# Patient Record
Sex: FEMALE | Race: WHITE | NOT HISPANIC OR LATINO | URBAN - METROPOLITAN AREA
[De-identification: names, ages, dates, MRNs, and addresses within clinical notes are randomized per-mention and may not be internally consistent; named-entity substitution may affect disease eponyms.]

---

## 2023-09-22 ENCOUNTER — HOSPITAL ENCOUNTER (OUTPATIENT)
Dept: DATA CONVERSION | Facility: HOSPITAL | Age: 83
End: 2023-09-22
Attending: INTERNAL MEDICINE | Admitting: INTERNAL MEDICINE

## 2023-09-22 DIAGNOSIS — R91.1 SOLITARY PULMONARY NODULE: ICD-10-CM

## 2023-09-22 DIAGNOSIS — R91.8 OTHER NONSPECIFIC ABNORMAL FINDING OF LUNG FIELD: ICD-10-CM

## 2023-09-22 DIAGNOSIS — R59.9 ENLARGED LYMPH NODES, UNSPECIFIED: ICD-10-CM

## 2023-09-22 DIAGNOSIS — C96.9 MALIGNANT NEOPLASM OF LYMPHOID, HEMATOPOIETIC AND RELATED TISSUE, UNSPECIFIED (MULTI): ICD-10-CM

## 2023-09-28 LAB
COMPLETE PATHOLOGY REPORT: NORMAL
CONVERTED CLINICAL DIAGNOSIS-HISTORY: NORMAL
CONVERTED DIAGNOSIS COMMENT: NORMAL
CONVERTED FINAL DIAGNOSIS: NORMAL
CONVERTED FINAL REPORT PDF LINK TO COPY AND PASTE: NORMAL
CONVERTED RAPID EVALUATION: NORMAL
CONVERTED SPECIMEN DESCRIPTION: NORMAL

## 2023-09-29 VITALS — HEIGHT: 60 IN | BODY MASS INDEX: 22.59 KG/M2 | WEIGHT: 115.08 LBS

## 2023-10-02 LAB
COMPLETE PATHOLOGY REPORT: NORMAL
CONVERTED CLINICAL DIAGNOSIS-HISTORY: NORMAL
CONVERTED FINAL DIAGNOSIS: NORMAL
CONVERTED FINAL REPORT PDF LINK TO COPY AND PASTE: NORMAL
CONVERTED GROSS DESCRIPTION: NORMAL

## 2023-11-13 PROCEDURE — 77290 THER RAD SIMULAJ FIELD CPLX: CPT | Performed by: RADIOLOGY

## 2023-11-13 PROCEDURE — 77263 THER RADIOLOGY TX PLNG CPLX: CPT | Performed by: RADIOLOGY

## 2023-11-14 PROCEDURE — 77300 RADIATION THERAPY DOSE PLAN: CPT | Performed by: RADIOLOGY

## 2023-11-14 PROCEDURE — 77295 3-D RADIOTHERAPY PLAN: CPT | Performed by: RADIOLOGY

## 2023-11-14 PROCEDURE — 77334 RADIATION TREATMENT AID(S): CPT | Performed by: RADIOLOGY

## 2023-11-22 PROCEDURE — 77427 RADIATION TX MANAGEMENT X5: CPT | Performed by: RADIOLOGY

## 2024-08-27 ENCOUNTER — NURSING HOME VISIT (OUTPATIENT)
Dept: POST ACUTE CARE | Facility: EXTERNAL LOCATION | Age: 84
End: 2024-08-27

## 2024-08-27 DIAGNOSIS — C34.90 MALIGNANT NEOPLASM OF LUNG, UNSPECIFIED LATERALITY, UNSPECIFIED PART OF LUNG (MULTI): ICD-10-CM

## 2024-08-27 DIAGNOSIS — G93.41 SEPSIS WITH METABOLIC ENCEPHALOPATHY (MULTI): ICD-10-CM

## 2024-08-27 DIAGNOSIS — I25.118 CORONARY ARTERY DISEASE INVOLVING NATIVE HEART WITH OTHER FORM OF ANGINA PECTORIS, UNSPECIFIED VESSEL OR LESION TYPE (CMS-HCC): ICD-10-CM

## 2024-08-27 DIAGNOSIS — R65.20 SEPSIS WITH METABOLIC ENCEPHALOPATHY (MULTI): ICD-10-CM

## 2024-08-27 DIAGNOSIS — J44.9 CHRONIC OBSTRUCTIVE PULMONARY DISEASE, UNSPECIFIED COPD TYPE (MULTI): Primary | ICD-10-CM

## 2024-08-27 DIAGNOSIS — A41.9 SEPSIS WITH METABOLIC ENCEPHALOPATHY (MULTI): ICD-10-CM

## 2024-08-27 DIAGNOSIS — I48.91 ATRIAL FIBRILLATION, UNSPECIFIED TYPE (MULTI): ICD-10-CM

## 2024-08-27 DIAGNOSIS — J96.91 RESPIRATORY FAILURE WITH HYPOXIA, UNSPECIFIED CHRONICITY (MULTI): ICD-10-CM

## 2024-08-27 PROBLEM — I25.10 CORONARY ARTERY DISEASE INVOLVING NATIVE HEART: Status: ACTIVE | Noted: 2024-08-27

## 2024-08-27 PROCEDURE — 99497 ADVNCD CARE PLAN 30 MIN: CPT | Performed by: EMERGENCY MEDICINE

## 2024-08-27 PROCEDURE — 99306 1ST NF CARE HIGH MDM 50: CPT | Performed by: EMERGENCY MEDICINE

## 2024-08-27 NOTE — PROGRESS NOTES
Tracy Cleveland   84 y.o.  female  @location@            Assessment and Plan:  History and physical    Diagnoses This Visit COPD with acute exacerbation Acute onset sepsis Acute UTI Lung cancer   Discharge Medications   New albuterol = Proventil, Ventolin (Proventil HFA 90 mcg/inh inhalation aerosol)1 Puffs Inhalation EVERY FOUR HOURS as needed for wheezing. Refills: 0. ALPRAZolam (Xanax 0.25 mg oral tablet)1 Tabs Oral THREE TIMES A DAY as needed for anxiety for 7 Days. Refills: 0. guaiFENesin (Mucinex 600 mg oral tablet, extended release)1 Tabs Oral EVERY TWELVE HOURS as needed Cough. losartan (losartan 50 mg oral tablet)1 Tabs Oral DAILY. methylPREDNISolone (Medrol 4 mg oral tablet)1 Packets Oral DAILY for 6 Days. as directed on package labeling. Refills: 0. metoprolol (Lopressor 50 mg oral tablet)0.5 Tabs Oral TWICE A DAY. Refills: 0.   Unchanged acetaminophen-oxycodone (Percocet 10 mg-325 mg oral tablet)1 Tabs Oral EVERY SIX HOURS as needed Severe Pain. baclofen = Lioresal (baclofen 5 mg oral tablet)1 Tabs Oral DAILY. Refills: 1.   Discontinued azithromycin (Zithromax TRI-MAYCO 500 mg oral tablet)1 Tabs Oral DAILY. stop date 8/16/24. Refills: 0. Hospital Course   This is the addendum to previously dictated discharge summary. Initially patient is planned to be released to home. But she is very weak. Could not take care of herself. Evaluated by physical therapist who recommended nursing home placement. Patient will be released to nursing home for further care.      I discussed advanced care planning for more than 16 minutes including the explanation and discussion of advanced directives. Information and advise was also provided on DO NOT RESUSCITATE and patient encouraged to consider this  Patient is not sure about DNR at this time.      Source of history: Nurse, Medical personnel, Medical record, Patient.  History limitation: None.    HPI:  History and physical    Patient is unable to give any detailed history and  therefore history is obtained from the chart  No acute complaints voiced by the patient or acute concerns raised by nursing    History of hospitalization-    Chief Complaint Shortness of breath History of Present Illness Patient is an 84-year-old female with history of chronic respiratory failure on home O2, COPD, lung cancer. She developed sudden onset of progressive shortness of breath since early in the morning. No fever or chills, no cough, no chest pain. No abdominal pain nausea or vomiting. Presented to the ED for evaluation. Initially she was seen respiratory acute distress. She received a bronchodilator aerosol treatment back-to-back. Chest x-ray did not show any new infiltrate. ABG was unremarkable. At the time of my evaluation she is feeling much better. Patient will be admitted for COPD exacerbation    Problem List/Past Medical History     Ongoing   Acute epigastric pain    Acute hypoxemic respiratory failure due to COVID-19    Acute respiratory failure, unspecified whether with hypoxia or hypercapnia    Anemia    Anxiety state    Atrial fibrillation    Cardiomyopathy    Cataract    Cellulitis of left lower limb    Cellulitis of lower limb    Cervical spondylosis    Chronic obstructive lung disease    Chronic obstructive pulmonary disease with acute lower respiratory infection    Chronic post-traumatic stress disorder    Chronic respiratory failure with hypoxia    Closed wedge compression fracture of seventh thoracic vertebra with routine healing    Compression fracture of vertebral column    COPD (chronic obstructive pulmonary disease)    COPD exacerbation    Coronary arteriosclerosis    COVID-19    Cystitis    Delirium    Depression    Depressive disorder    Diabetes mellitus    Diabetes mellitus    Difficulty in walking, not elsewhere classified    Disorder of lumbar spine    Dyspnea    Enlarged lymph node in neck    ESBL Escherichia coli    Essential  hypertension    Fibromyalgia    Fibromyalgia    GERD (gastroesophageal reflux disease)    High Blood Pressure    History of peripheral neuropathy    History of urinary tract infection    Hospitalization within last 30 days    Hypoxia    Incontinence of urine    Injury of kidney    Insomnia    Insomnia disorder    Intestinal obstruction    Long term (current) use of aspirin    Long term prescription opiate use    Lumbar arthropathy    Lung mass    Mass of right lower lobe of lung    Mediastinal lymphadenopathy    Metabolic encephalopathy    Mitral valve disorder    Mixed hyperlipidemia    Mixed hyperlipidemia    Muscle weakness    Myocardial infarction type 2    Need for vaccination    Neuropathy    Obesity due to excess calories    Osteoarthritis    Osteoporosis    Osteoporosis    Paraesophageal hernia    Paraesophageal hernia    Paresthesia    Patella fracture    Peripheral edema    Personal history of (healed) traumatic fracture    Primary gonarthrosis, bilateral    Unspecified fracture of left patella, subsequent encounter for closed fracture with routine healing    Unspecified hearing loss, unspecified ear    Vertebral compression fracture    Vertebral compression fracture    Viral pneumonia    Wellness examination    Historical   Solitary nodule of lung      Procedure/Surgical History   Colonoscopy.: 05/01/23   esophagogastroduodenoscopy(EGD).: 04/28/23   esophagogastroduodenoscopy(EGD).: 02/01/23   esophagogastroduodenoscopy(EGD).: 09/18/22   CAPSULE ENDOSCOPY: 06/23/22   COLONOSCOPY: 06/22/22   esophagogastroduodenoscopy(EGD).: 06/18/22   esophagogastroduodenoscopy(EGD).: 04/26/22   esophagogastroduodenoscopy(EGD).: 07/13/21   EGD: 03/11/21   Colonoscopy: 02/16/21   esophagogastroduodenoscopy(EGD).: 01/16/21   CATARACT SURGERY - RIGHT: 08/2017   CATARACT SURGERY - LEFT: 08/2017   Total Knee Replacement.: 12/15/16   ECHOCARDIOGRAM: 11/29/16   colonscopy: 07/21/16   esophagogastroduodenoscopy(EGD).: 07/05/16    nuclear stress normal: 10/20/14   EGD: 2012   incisional hernia: 02/19/08   twisted bowels: 03/20/06   bowel surgery   nuclear stress 5/2015   normal   1/2 partial SBO   Colonoscopy.      Medications     Inpatient   acetaminophen, 650 mg= 2 tabs, ORAL, [id number], PRN    acetaminophen, 650 mg= 2 tabs, ORAL, [id number], PRN    acetaminophen, 650 mg= 2 tabs, ORAL, [id number], PRN    acetaminophen-oxycodone(acetaminophen-oxycodone 325 mg-5 mg oral tablet = Percocet), 2 tabs, ORAL, [id number], PRN    albuterol-ipratropium(albuterol-ipratropium 2.5 mg-0.5 mg/3 mL inhalation solution = DuoNeb), 3 mL, Inhalation, [id number] RESPIRATORY    azithromycin, 500 mg= 255 mL, IV Piggyback, ONCE    azithromycin(Zithromax IV), 500 mg= 255 mL, IV Piggyback, [id number]    baclofen = Lioresal, 5 mg= 0.5 tabs, ORAL, DAILY    ceftriaxone, 1 g= 50 mL, IV Piggyback, ONCE    enoxaparin(Lovenox), 40 mg= 0.4 mL, Subcutaneous, QHS    melatonin, 5 mg= 1 tabs, ORAL, [id number], PRN    methylPREDNISolone(SOLU-Medrol), 40 mg= 1 mL, IV Push, [id number]    naloxone = Narcan, 0.4 mg= 1 mL, IV Push, PRN, PRN    sodium chloride(Saline Flush), 3 mL, IV Push, [id number]    sodium chloride(Saline Flush), 3 mL, IV Push, PRN, PRN    Home   acetaminophen-oxycodone(Percocet 10 mg-325 mg oral tablet), 1 tabs, ORAL, [id number], PRN    baclofen = Lioresal(baclofen 5 mg oral tablet), 5 mg= 1 tabs, ORAL, DAILY, 1 refills      Allergies     Acetaminophen-Propoxyphene Napsylate Unknown    Demerol HCl vomit/ headache    morphine Unknown    shellfish Headache      Social History       Alcohol - Denies Alcohol Use      Domestic Concerns    *Financial Concerns Regarding Hospitalization/DischargeNo    Financial ConcernsNo      Employment/School    Status:Retired    Highest education:High school      Exercise      Home/Environment    Lives with:Daughter    *Living Situation Prior to AdmissionHome/Independent    Home equipment:Respiratory treatments    Monitoring Equipment in homeApnea  monitor, Glucose monitoring, Blood pressure    *Special Services and Community Resources Prior to AdmissionNone    *Mobility Assistance Prior to AdmissionIndependent    Home BarriersInternal Stairs    *Will patient require additional/new services upon discharge?No      Nutrition/Health    Type of diet:Regular    AppetiteGood    Feeding AssistanceIndependent      Other    Name:Daily Caffeine    Details:Consumes on average 5-10 cups of regular coffee per day Consumes on average 12oz of soda per day - diet pepsi Consumes chocolate occasionally      Substance Abuse - Denies Substance Abuse      Tobacco - Denies Tobacco Use    Use:Former smoker, quit more than 30 days ago      Family History     Arthritis: Mother.    Cancer: Negative: Mother, Father, Daughter and Son.    Colonic polyp: Negative: Daughter and Son.    Dementia: Mother.    Diabetes..: Father.    Heart disease: Father.    High blood pressure..: Mother and Father.    Health Status Family Member(s)     Family Member(s)   Relationship: Father, Age: Unknown    Relationship: Mother, Age: Unknown      Physical Exam:  Vital signs as per nursing/MA documentation were reviewed  General appearance: Alert and in no acute distress  HEENT: Normal Inspection  Neck - Normal Inspection  Respiratory : No respiratory distress. Lungs are clear   Cardiovascular: heart rate normal. No gallop  Back - normal inspection  Skin inspection:Warm  Musculoskeletal : No deformities  Neuro : Limited exam. Baseline    ROS: Review of symptoms is negative except for what is mentioned in HPI    Results/Data  Records including but not limited to electronic medical records, relevant lab work and imaging from patient's health care facility encounter were reviewed and independently verified      Charting was completed using voice recognition technology and may include unintended errors.    Discussed with patient/family, RN, and NP.

## 2024-08-27 NOTE — LETTER
Patient: Tracy Cleveland  : 1940    Encounter Date: 2024    Tracy Cleveland   84 y.o.  female  @location@            Assessment and Plan:  History and physical    Diagnoses This Visit COPD with acute exacerbation Acute onset sepsis Acute UTI Lung cancer   Discharge Medications   New albuterol = Proventil, Ventolin (Proventil HFA 90 mcg/inh inhalation aerosol)1 Puffs Inhalation EVERY FOUR HOURS as needed for wheezing. Refills: 0. ALPRAZolam (Xanax 0.25 mg oral tablet)1 Tabs Oral THREE TIMES A DAY as needed for anxiety for 7 Days. Refills: 0. guaiFENesin (Mucinex 600 mg oral tablet, extended release)1 Tabs Oral EVERY TWELVE HOURS as needed Cough. losartan (losartan 50 mg oral tablet)1 Tabs Oral DAILY. methylPREDNISolone (Medrol 4 mg oral tablet)1 Packets Oral DAILY for 6 Days. as directed on package labeling. Refills: 0. metoprolol (Lopressor 50 mg oral tablet)0.5 Tabs Oral TWICE A DAY. Refills: 0.   Unchanged acetaminophen-oxycodone (Percocet 10 mg-325 mg oral tablet)1 Tabs Oral EVERY SIX HOURS as needed Severe Pain. baclofen = Lioresal (baclofen 5 mg oral tablet)1 Tabs Oral DAILY. Refills: 1.   Discontinued azithromycin (Zithromax TRI-MAYCO 500 mg oral tablet)1 Tabs Oral DAILY. stop date 24. Refills: 0. Hospital Course   This is the addendum to previously dictated discharge summary. Initially patient is planned to be released to home. But she is very weak. Could not take care of herself. Evaluated by physical therapist who recommended nursing home placement. Patient will be released to nursing home for further care.      I discussed advanced care planning for more than 16 minutes including the explanation and discussion of advanced directives. Information and advise was also provided on DO NOT RESUSCITATE and patient encouraged to consider this  Patient is not sure about DNR at this time.      Source of history: Nurse, Medical personnel, Medical record, Patient.  History limitation: None.    HPI:  History  and physical    Patient is unable to give any detailed history and therefore history is obtained from the chart  No acute complaints voiced by the patient or acute concerns raised by nursing    History of hospitalization-    Chief Complaint Shortness of breath History of Present Illness Patient is an 84-year-old female with history of chronic respiratory failure on home O2, COPD, lung cancer. She developed sudden onset of progressive shortness of breath since early in the morning. No fever or chills, no cough, no chest pain. No abdominal pain nausea or vomiting. Presented to the ED for evaluation. Initially she was seen respiratory acute distress. She received a bronchodilator aerosol treatment back-to-back. Chest x-ray did not show any new infiltrate. ABG was unremarkable. At the time of my evaluation she is feeling much better. Patient will be admitted for COPD exacerbation    Problem List/Past Medical History     Ongoing   Acute epigastric pain    Acute hypoxemic respiratory failure due to COVID-19    Acute respiratory failure, unspecified whether with hypoxia or hypercapnia    Anemia    Anxiety state    Atrial fibrillation    Cardiomyopathy    Cataract    Cellulitis of left lower limb    Cellulitis of lower limb    Cervical spondylosis    Chronic obstructive lung disease    Chronic obstructive pulmonary disease with acute lower respiratory infection    Chronic post-traumatic stress disorder    Chronic respiratory failure with hypoxia    Closed wedge compression fracture of seventh thoracic vertebra with routine healing    Compression fracture of vertebral column    COPD (chronic obstructive pulmonary disease)    COPD exacerbation    Coronary arteriosclerosis    COVID-19    Cystitis    Delirium    Depression    Depressive disorder    Diabetes mellitus    Diabetes mellitus    Difficulty in walking, not elsewhere classified    Disorder of lumbar spine    Dyspnea    Enlarged lymph node in neck    ESBL Escherichia  coli    Essential hypertension    Fibromyalgia    Fibromyalgia    GERD (gastroesophageal reflux disease)    High Blood Pressure    History of peripheral neuropathy    History of urinary tract infection    Hospitalization within last 30 days    Hypoxia    Incontinence of urine    Injury of kidney    Insomnia    Insomnia disorder    Intestinal obstruction    Long term (current) use of aspirin    Long term prescription opiate use    Lumbar arthropathy    Lung mass    Mass of right lower lobe of lung    Mediastinal lymphadenopathy    Metabolic encephalopathy    Mitral valve disorder    Mixed hyperlipidemia    Mixed hyperlipidemia    Muscle weakness    Myocardial infarction type 2    Need for vaccination    Neuropathy    Obesity due to excess calories    Osteoarthritis    Osteoporosis    Osteoporosis    Paraesophageal hernia    Paraesophageal hernia    Paresthesia    Patella fracture    Peripheral edema    Personal history of (healed) traumatic fracture    Primary gonarthrosis, bilateral    Unspecified fracture of left patella, subsequent encounter for closed fracture with routine healing    Unspecified hearing loss, unspecified ear    Vertebral compression fracture    Vertebral compression fracture    Viral pneumonia    Wellness examination    Historical   Solitary nodule of lung      Procedure/Surgical History   Colonoscopy.: 05/01/23   esophagogastroduodenoscopy(EGD).: 04/28/23   esophagogastroduodenoscopy(EGD).: 02/01/23   esophagogastroduodenoscopy(EGD).: 09/18/22   CAPSULE ENDOSCOPY: 06/23/22   COLONOSCOPY: 06/22/22   esophagogastroduodenoscopy(EGD).: 06/18/22   esophagogastroduodenoscopy(EGD).: 04/26/22   esophagogastroduodenoscopy(EGD).: 07/13/21   EGD: 03/11/21   Colonoscopy: 02/16/21   esophagogastroduodenoscopy(EGD).: 01/16/21   CATARACT SURGERY - RIGHT: 08/2017   CATARACT SURGERY - LEFT: 08/2017   Total Knee Replacement.: 12/15/16   ECHOCARDIOGRAM: 11/29/16   colonscopy: 07/21/16    esophagogastroduodenoscopy(EGD).: 07/05/16   nuclear stress normal: 10/20/14   EGD: 2012   incisional hernia: 02/19/08   twisted bowels: 03/20/06   bowel surgery   nuclear stress 5/2015   normal   1/2 partial SBO   Colonoscopy.      Medications     Inpatient   acetaminophen, 650 mg= 2 tabs, ORAL, [id number], PRN    acetaminophen, 650 mg= 2 tabs, ORAL, [id number], PRN    acetaminophen, 650 mg= 2 tabs, ORAL, [id number], PRN    acetaminophen-oxycodone(acetaminophen-oxycodone 325 mg-5 mg oral tablet = Percocet), 2 tabs, ORAL, [id number], PRN    albuterol-ipratropium(albuterol-ipratropium 2.5 mg-0.5 mg/3 mL inhalation solution = DuoNeb), 3 mL, Inhalation, [id number] RESPIRATORY    azithromycin, 500 mg= 255 mL, IV Piggyback, ONCE    azithromycin(Zithromax IV), 500 mg= 255 mL, IV Piggyback, [id number]    baclofen = Lioresal, 5 mg= 0.5 tabs, ORAL, DAILY    ceftriaxone, 1 g= 50 mL, IV Piggyback, ONCE    enoxaparin(Lovenox), 40 mg= 0.4 mL, Subcutaneous, QHS    melatonin, 5 mg= 1 tabs, ORAL, [id number], PRN    methylPREDNISolone(SOLU-Medrol), 40 mg= 1 mL, IV Push, [id number]    naloxone = Narcan, 0.4 mg= 1 mL, IV Push, PRN, PRN    sodium chloride(Saline Flush), 3 mL, IV Push, [id number]    sodium chloride(Saline Flush), 3 mL, IV Push, PRN, PRN    Home   acetaminophen-oxycodone(Percocet 10 mg-325 mg oral tablet), 1 tabs, ORAL, [id number], PRN    baclofen = Lioresal(baclofen 5 mg oral tablet), 5 mg= 1 tabs, ORAL, DAILY, 1 refills      Allergies     Acetaminophen-Propoxyphene Napsylate Unknown    Demerol HCl vomit/ headache    morphine Unknown    shellfish Headache      Social History       Alcohol - Denies Alcohol Use      Domestic Concerns    *Financial Concerns Regarding Hospitalization/DischargeNo    Financial ConcernsNo      Employment/School    Status:Retired    Highest education:High school      Exercise      Home/Environment    Lives with:Daughter    *Living Situation Prior to AdmissionHome/Independent    Home equipment:Respiratory  treatments    Monitoring Equipment in homeApnea monitor, Glucose monitoring, Blood pressure    *Special Services and Community Resources Prior to AdmissionNone    *Mobility Assistance Prior to AdmissionIndependent    Home BarriersInternal Stairs    *Will patient require additional/new services upon discharge?No      Nutrition/Health    Type of diet:Regular    AppetiteGood    Feeding AssistanceIndependent      Other    Name:Daily Caffeine    Details:Consumes on average 5-10 cups of regular coffee per day Consumes on average 12oz of soda per day - diet pepsi Consumes chocolate occasionally      Substance Abuse - Denies Substance Abuse      Tobacco - Denies Tobacco Use    Use:Former smoker, quit more than 30 days ago      Family History     Arthritis: Mother.    Cancer: Negative: Mother, Father, Daughter and Son.    Colonic polyp: Negative: Daughter and Son.    Dementia: Mother.    Diabetes..: Father.    Heart disease: Father.    High blood pressure..: Mother and Father.    Health Status Family Member(s)     Family Member(s)   Relationship: Father, Age: Unknown    Relationship: Mother, Age: Unknown      Physical Exam:  Vital signs as per nursing/MA documentation were reviewed  General appearance: Alert and in no acute distress  HEENT: Normal Inspection  Neck - Normal Inspection  Respiratory : No respiratory distress. Lungs are clear   Cardiovascular: heart rate normal. No gallop  Back - normal inspection  Skin inspection:Warm  Musculoskeletal : No deformities  Neuro : Limited exam. Baseline    ROS: Review of symptoms is negative except for what is mentioned in HPI    Results/Data  Records including but not limited to electronic medical records, relevant lab work and imaging from patient's health care facility encounter were reviewed and independently verified      Charting was completed using voice recognition technology and may include unintended errors.    Discussed with patient/family, RN, and  NP.      Electronically Signed By: Umer Montano MD   8/27/24  4:02 PM

## 2024-09-10 ENCOUNTER — NURSING HOME VISIT (OUTPATIENT)
Dept: POST ACUTE CARE | Facility: EXTERNAL LOCATION | Age: 84
End: 2024-09-10

## 2024-09-10 DIAGNOSIS — K52.9 ACUTE GASTROENTERITIS: ICD-10-CM

## 2024-09-10 DIAGNOSIS — Z23 ENCOUNTER FOR IMMUNIZATION: ICD-10-CM

## 2024-09-10 DIAGNOSIS — N93.9 ABNORMAL UTERINE BLEEDING: ICD-10-CM

## 2024-09-10 DIAGNOSIS — Z13.5 DIABETIC RETINOPATHY SCREENING: ICD-10-CM

## 2024-09-10 DIAGNOSIS — Z00.00 ENCOUNTER FOR MEDICARE ANNUAL WELLNESS EXAM: Primary | ICD-10-CM

## 2024-09-10 DIAGNOSIS — F43.0 ACUTE REACTION TO STRESS: ICD-10-CM

## 2024-09-10 PROCEDURE — 99309 SBSQ NF CARE MODERATE MDM 30: CPT | Performed by: EMERGENCY MEDICINE

## 2024-09-10 NOTE — LETTER
Patient: Tracy Cleveland  : 1940    Encounter Date: 09/10/2024    Tracy Cleveland   84 y.o.  female  @location@    Patient is being seen for subsequent Medicare wellness and/or physical    Past Medical, Surgical and Family History: reviewed and updated in chart.   Medications and Supplements: Review of all medications by a prescribing practitioner or clinical pharmacist (such as prescriptions, OTCs, herbal therapies and supplements) documented in the medical record.    No, the patient is not using opioids.   Patient Self Assessment of Health Status: fair.   Tobacco use: Non-User The patient is a former cigarette smoker.   Alcohol use: As noted in social history   Illicit drug use: As noted in social history   Current diet: well balanced diet.   Exercise Frequency: the patient does not exercise.   Depression/Suicide Screening:  .   During the past 2 weeks, the patient has not felt down, depressed or hopeless.    During the past 2 weeks, the patient has not felt little interest or pleasure in doing things.    Hearing Impairment: Patient has slight hearing impairment.   Cognitive Impairment: Cognitive impairment was observed.      Bathing: dependent.   Dressing: dependent.   Walking: dependent.   Toileting: dependent.   Feeding: dependent.   Personal Hygiene: dependent.   Managing Finances: dependent.   Shopping: dependent.   Managing Medications: dependent.   Housework / Basic Home Maintenance: dependent.   Handling Transportation: dependent.   Preparing Meals: dependent.   Using the Telephone/ Communication Devices: dependent.    Falls Risk Screening:. Has not fallen in the last 6 months.   Home safety risk factors: none.   Advance directives:. Advanced Care Planning discussed and documented advance care plan or surrogate decision maker documented in the medical record.   A Conversation about Advance directives of at least 16 minutes has occurred. Actual time spent: 20   Topics discussed: Code status per nursing  documentation filed with facility.        Assessment and Plan:      Diagnoses This Visit COPD with acute exacerbation Acute onset sepsis Acute UTI Lung cancer   Discharge Medications   New albuterol = Proventil, Ventolin (Proventil HFA 90 mcg/inh inhalation aerosol)1 Puffs Inhalation EVERY FOUR HOURS as needed for wheezing. Refills: 0. ALPRAZolam (Xanax 0.25 mg oral tablet)1 Tabs Oral THREE TIMES A DAY as needed for anxiety for 7 Days. Refills: 0. guaiFENesin (Mucinex 600 mg oral tablet, extended release)1 Tabs Oral EVERY TWELVE HOURS as needed Cough. losartan (losartan 50 mg oral tablet)1 Tabs Oral DAILY. methylPREDNISolone (Medrol 4 mg oral tablet)1 Packets Oral DAILY for 6 Days. as directed on package labeling. Refills: 0. metoprolol (Lopressor 50 mg oral tablet)0.5 Tabs Oral TWICE A DAY. Refills: 0.   Unchanged acetaminophen-oxycodone (Percocet 10 mg-325 mg oral tablet)1 Tabs Oral EVERY SIX HOURS as needed Severe Pain. baclofen = Lioresal (baclofen 5 mg oral tablet)1 Tabs Oral DAILY. Refills: 1.   Discontinued azithromycin (Zithromax TRI-MAYCO 500 mg oral tablet)1 Tabs Oral DAILY. stop date 8/16/24. Refills: 0. Hospital Course   This is the addendum to previously dictated discharge summary. Initially patient is planned to be released to home. But she is very weak. Could not take care of herself. Evaluated by physical therapist who recommended nursing home placement. Patient will be released to nursing home for further care.    I discussed advanced care planning for more than 16 minutes including the explanation and discussion of advanced directives. Information and advise was also provided on DO NOT RESUSCITATE and patient encouraged to consider this  Patient is not sure about DNR at this time.      Source of history: Nurse, Medical personnel, Medical record, Patient.  History limitation: None.    HPI:      Patient is unable to give any detailed history and therefore history is obtained from the chart  No acute  complaints voiced by the patient or acute concerns raised by nursing    History of hospitalization-    Chief Complaint Shortness of breath History of Present Illness Patient is an 84-year-old female with history of chronic respiratory failure on home O2, COPD, lung cancer. She developed sudden onset of progressive shortness of breath since early in the morning. No fever or chills, no cough, no chest pain. No abdominal pain nausea or vomiting. Presented to the ED for evaluation. Initially she was seen respiratory acute distress. She received a bronchodilator aerosol treatment back-to-back. Chest x-ray did not show any new infiltrate. ABG was unremarkable. At the time of my evaluation she is feeling much better. Patient will be admitted for COPD exacerbation    Problem List/Past Medical History     Ongoing   Acute epigastric pain    Acute hypoxemic respiratory failure due to COVID-19    Acute respiratory failure, unspecified whether with hypoxia or hypercapnia    Anemia    Anxiety state    Atrial fibrillation    Cardiomyopathy    Cataract    Cellulitis of left lower limb    Cellulitis of lower limb    Cervical spondylosis    Chronic obstructive lung disease    Chronic obstructive pulmonary disease with acute lower respiratory infection    Chronic post-traumatic stress disorder    Chronic respiratory failure with hypoxia    Closed wedge compression fracture of seventh thoracic vertebra with routine healing    Compression fracture of vertebral column    COPD (chronic obstructive pulmonary disease)    COPD exacerbation    Coronary arteriosclerosis    COVID-19    Cystitis    Delirium    Depression    Depressive disorder    Diabetes mellitus    Diabetes mellitus    Difficulty in walking, not elsewhere classified    Disorder of lumbar spine    Dyspnea    Enlarged lymph node in neck    ESBL Escherichia coli    Essential hypertension    Fibromyalgia    Fibromyalgia    GERD (gastroesophageal reflux disease)    High Blood  Pressure    History of peripheral neuropathy    History of urinary tract infection    Hospitalization within last 30 days    Hypoxia    Incontinence of urine    Injury of kidney    Insomnia    Insomnia disorder    Intestinal obstruction    Long term (current) use of aspirin    Long term prescription opiate use    Lumbar arthropathy    Lung mass    Mass of right lower lobe of lung    Mediastinal lymphadenopathy    Metabolic encephalopathy    Mitral valve disorder    Mixed hyperlipidemia    Mixed hyperlipidemia    Muscle weakness    Myocardial infarction type 2    Need for vaccination    Neuropathy    Obesity due to excess calories    Osteoarthritis    Osteoporosis    Osteoporosis    Paraesophageal hernia    Paraesophageal hernia    Paresthesia    Patella fracture    Peripheral edema    Personal history of (healed) traumatic fracture    Primary gonarthrosis, bilateral    Unspecified fracture of left patella, subsequent encounter for closed fracture with routine healing    Unspecified hearing loss, unspecified ear    Vertebral compression fracture    Vertebral compression fracture    Viral pneumonia    Wellness examination    Historical   Solitary nodule of lung      Procedure/Surgical History   Colonoscopy.: 05/01/23   esophagogastroduodenoscopy(EGD).: 04/28/23   esophagogastroduodenoscopy(EGD).: 02/01/23   esophagogastroduodenoscopy(EGD).: 09/18/22   CAPSULE ENDOSCOPY: 06/23/22   COLONOSCOPY: 06/22/22   esophagogastroduodenoscopy(EGD).: 06/18/22   esophagogastroduodenoscopy(EGD).: 04/26/22   esophagogastroduodenoscopy(EGD).: 07/13/21   EGD: 03/11/21   Colonoscopy: 02/16/21   esophagogastroduodenoscopy(EGD).: 01/16/21   CATARACT SURGERY - RIGHT: 08/2017   CATARACT SURGERY - LEFT: 08/2017   Total Knee Replacement.: 12/15/16   ECHOCARDIOGRAM: 11/29/16   colonscopy: 07/21/16   esophagogastroduodenoscopy(EGD).: 07/05/16   nuclear stress normal: 10/20/14   EGD: 2012   incisional hernia: 02/19/08   twisted bowels:  03/20/06   bowel surgery   nuclear stress 5/2015   normal   1/2 partial SBO   Colonoscopy.      Medications     Inpatient   acetaminophen, 650 mg= 2 tabs, ORAL, [id number], PRN    acetaminophen, 650 mg= 2 tabs, ORAL, [id number], PRN    acetaminophen, 650 mg= 2 tabs, ORAL, [id number], PRN    acetaminophen-oxycodone(acetaminophen-oxycodone 325 mg-5 mg oral tablet = Percocet), 2 tabs, ORAL, [id number], PRN    albuterol-ipratropium(albuterol-ipratropium 2.5 mg-0.5 mg/3 mL inhalation solution = DuoNeb), 3 mL, Inhalation, [id number] RESPIRATORY    azithromycin, 500 mg= 255 mL, IV Piggyback, ONCE    azithromycin(Zithromax IV), 500 mg= 255 mL, IV Piggyback, [id number]    baclofen = Lioresal, 5 mg= 0.5 tabs, ORAL, DAILY    ceftriaxone, 1 g= 50 mL, IV Piggyback, ONCE    enoxaparin(Lovenox), 40 mg= 0.4 mL, Subcutaneous, QHS    melatonin, 5 mg= 1 tabs, ORAL, [id number], PRN    methylPREDNISolone(SOLU-Medrol), 40 mg= 1 mL, IV Push, [id number]    naloxone = Narcan, 0.4 mg= 1 mL, IV Push, PRN, PRN    sodium chloride(Saline Flush), 3 mL, IV Push, [id number]    sodium chloride(Saline Flush), 3 mL, IV Push, PRN, PRN    Home   acetaminophen-oxycodone(Percocet 10 mg-325 mg oral tablet), 1 tabs, ORAL, [id number], PRN    baclofen = Lioresal(baclofen 5 mg oral tablet), 5 mg= 1 tabs, ORAL, DAILY, 1 refills      Allergies     Acetaminophen-Propoxyphene Napsylate Unknown    Demerol HCl vomit/ headache    morphine Unknown    shellfish Headache      Social History       Alcohol - Denies Alcohol Use      Domestic Concerns    *Financial Concerns Regarding Hospitalization/DischargeNo    Financial ConcernsNo      Employment/School    Status:Retired    Highest education:High school      Exercise      Home/Environment    Lives with:Daughter    *Living Situation Prior to AdmissionHome/Independent    Home equipment:Respiratory treatments    Monitoring Equipment in homeApnea monitor, Glucose monitoring, Blood pressure    *Special Services and Community Resources Prior  to AdmissionNone    *Mobility Assistance Prior to AdmissionIndependent    Home BarriersInternal Stairs    *Will patient require additional/new services upon discharge?No      Nutrition/Health    Type of diet:Regular    AppetiteGood    Feeding AssistanceIndependent      Other    Name:Daily Caffeine    Details:Consumes on average 5-10 cups of regular coffee per day Consumes on average 12oz of soda per day - diet pepsi Consumes chocolate occasionally      Substance Abuse - Denies Substance Abuse      Tobacco - Denies Tobacco Use    Use:Former smoker, quit more than 30 days ago      Family History     Arthritis: Mother.    Cancer: Negative: Mother, Father, Daughter and Son.    Colonic polyp: Negative: Daughter and Son.    Dementia: Mother.    Diabetes..: Father.    Heart disease: Father.    High blood pressure..: Mother and Father.    Health Status Family Member(s)     Family Member(s)   Relationship: Father, Age: Unknown    Relationship: Mother, Age: Unknown    -Fall prevention    -Cognitive engagement    -Monitor and treat blood pressure    -Aggressive decubitus ulcer prevention.    -Bowel and bladder care    -Optimal nutrition and supplementation as needed    -GI  and DVT prophylaxis    -Symptom control    -Ambulation as tolerated    -Will follow      Physical Exam:  Vital signs as per nursing/MA documentation were reviewed  General appearance: Alert and in no acute distress  HEENT: Normal Inspection  Neck - Normal Inspection  Respiratory : No respiratory distress. Lungs are clear   Cardiovascular: heart rate normal. No gallop  Back - normal inspection  Skin inspection:Warm  Musculoskeletal : No deformities  Neuro : Limited exam. Baseline    ROS: Review of symptoms is negative except for what is mentioned in HPI    Results/Data  Records including but not limited to electronic medical records, relevant lab work and imaging from patient's health care facility encounter were reviewed and independently  verified      Charting was completed using voice recognition technology and may include unintended errors.    Discussed with patient/family, RN, and NP.      Electronically Signed By: Umer Montano MD   9/15/24  8:21 AM

## 2024-09-11 NOTE — PROGRESS NOTES
Tracy Cleveland   84 y.o.  female  @location@    Patient is being seen for subsequent Medicare wellness and/or physical    Past Medical, Surgical and Family History: reviewed and updated in chart.   Medications and Supplements: Review of all medications by a prescribing practitioner or clinical pharmacist (such as prescriptions, OTCs, herbal therapies and supplements) documented in the medical record.    No, the patient is not using opioids.   Patient Self Assessment of Health Status: fair.   Tobacco use: Non-User The patient is a former cigarette smoker.   Alcohol use: As noted in social history   Illicit drug use: As noted in social history   Current diet: well balanced diet.   Exercise Frequency: the patient does not exercise.   Depression/Suicide Screening:  .   During the past 2 weeks, the patient has not felt down, depressed or hopeless.    During the past 2 weeks, the patient has not felt little interest or pleasure in doing things.    Hearing Impairment: Patient has slight hearing impairment.   Cognitive Impairment: Cognitive impairment was observed.      Bathing: dependent.   Dressing: dependent.   Walking: dependent.   Toileting: dependent.   Feeding: dependent.   Personal Hygiene: dependent.   Managing Finances: dependent.   Shopping: dependent.   Managing Medications: dependent.   Housework / Basic Home Maintenance: dependent.   Handling Transportation: dependent.   Preparing Meals: dependent.   Using the Telephone/ Communication Devices: dependent.    Falls Risk Screening:. Has not fallen in the last 6 months.   Home safety risk factors: none.   Advance directives:. Advanced Care Planning discussed and documented advance care plan or surrogate decision maker documented in the medical record.   A Conversation about Advance directives of at least 16 minutes has occurred. Actual time spent: 20   Topics discussed: Code status per nursing documentation filed with facility.        Assessment and  Plan:      Diagnoses This Visit COPD with acute exacerbation Acute onset sepsis Acute UTI Lung cancer   Discharge Medications   New albuterol = Proventil, Ventolin (Proventil HFA 90 mcg/inh inhalation aerosol)1 Puffs Inhalation EVERY FOUR HOURS as needed for wheezing. Refills: 0. ALPRAZolam (Xanax 0.25 mg oral tablet)1 Tabs Oral THREE TIMES A DAY as needed for anxiety for 7 Days. Refills: 0. guaiFENesin (Mucinex 600 mg oral tablet, extended release)1 Tabs Oral EVERY TWELVE HOURS as needed Cough. losartan (losartan 50 mg oral tablet)1 Tabs Oral DAILY. methylPREDNISolone (Medrol 4 mg oral tablet)1 Packets Oral DAILY for 6 Days. as directed on package labeling. Refills: 0. metoprolol (Lopressor 50 mg oral tablet)0.5 Tabs Oral TWICE A DAY. Refills: 0.   Unchanged acetaminophen-oxycodone (Percocet 10 mg-325 mg oral tablet)1 Tabs Oral EVERY SIX HOURS as needed Severe Pain. baclofen = Lioresal (baclofen 5 mg oral tablet)1 Tabs Oral DAILY. Refills: 1.   Discontinued azithromycin (Zithromax TRI-MAYCO 500 mg oral tablet)1 Tabs Oral DAILY. stop date 8/16/24. Refills: 0. Hospital Course   This is the addendum to previously dictated discharge summary. Initially patient is planned to be released to home. But she is very weak. Could not take care of herself. Evaluated by physical therapist who recommended nursing home placement. Patient will be released to nursing home for further care.    I discussed advanced care planning for more than 16 minutes including the explanation and discussion of advanced directives. Information and advise was also provided on DO NOT RESUSCITATE and patient encouraged to consider this  Patient is not sure about DNR at this time.      Source of history: Nurse, Medical personnel, Medical record, Patient.  History limitation: None.    HPI:      Patient is unable to give any detailed history and therefore history is obtained from the chart  No acute complaints voiced by the patient or acute concerns raised  by nursing    History of hospitalization-    Chief Complaint Shortness of breath History of Present Illness Patient is an 84-year-old female with history of chronic respiratory failure on home O2, COPD, lung cancer. She developed sudden onset of progressive shortness of breath since early in the morning. No fever or chills, no cough, no chest pain. No abdominal pain nausea or vomiting. Presented to the ED for evaluation. Initially she was seen respiratory acute distress. She received a bronchodilator aerosol treatment back-to-back. Chest x-ray did not show any new infiltrate. ABG was unremarkable. At the time of my evaluation she is feeling much better. Patient will be admitted for COPD exacerbation    Problem List/Past Medical History     Ongoing   Acute epigastric pain    Acute hypoxemic respiratory failure due to COVID-19    Acute respiratory failure, unspecified whether with hypoxia or hypercapnia    Anemia    Anxiety state    Atrial fibrillation    Cardiomyopathy    Cataract    Cellulitis of left lower limb    Cellulitis of lower limb    Cervical spondylosis    Chronic obstructive lung disease    Chronic obstructive pulmonary disease with acute lower respiratory infection    Chronic post-traumatic stress disorder    Chronic respiratory failure with hypoxia    Closed wedge compression fracture of seventh thoracic vertebra with routine healing    Compression fracture of vertebral column    COPD (chronic obstructive pulmonary disease)    COPD exacerbation    Coronary arteriosclerosis    COVID-19    Cystitis    Delirium    Depression    Depressive disorder    Diabetes mellitus    Diabetes mellitus    Difficulty in walking, not elsewhere classified    Disorder of lumbar spine    Dyspnea    Enlarged lymph node in neck    ESBL Escherichia coli    Essential hypertension    Fibromyalgia    Fibromyalgia    GERD (gastroesophageal reflux disease)    High Blood Pressure    History of peripheral neuropathy    History of  urinary tract infection    Hospitalization within last 30 days    Hypoxia    Incontinence of urine    Injury of kidney    Insomnia    Insomnia disorder    Intestinal obstruction    Long term (current) use of aspirin    Long term prescription opiate use    Lumbar arthropathy    Lung mass    Mass of right lower lobe of lung    Mediastinal lymphadenopathy    Metabolic encephalopathy    Mitral valve disorder    Mixed hyperlipidemia    Mixed hyperlipidemia    Muscle weakness    Myocardial infarction type 2    Need for vaccination    Neuropathy    Obesity due to excess calories    Osteoarthritis    Osteoporosis    Osteoporosis    Paraesophageal hernia    Paraesophageal hernia    Paresthesia    Patella fracture    Peripheral edema    Personal history of (healed) traumatic fracture    Primary gonarthrosis, bilateral    Unspecified fracture of left patella, subsequent encounter for closed fracture with routine healing    Unspecified hearing loss, unspecified ear    Vertebral compression fracture    Vertebral compression fracture    Viral pneumonia    Wellness examination    Historical   Solitary nodule of lung      Procedure/Surgical History   Colonoscopy.: 05/01/23   esophagogastroduodenoscopy(EGD).: 04/28/23   esophagogastroduodenoscopy(EGD).: 02/01/23   esophagogastroduodenoscopy(EGD).: 09/18/22   CAPSULE ENDOSCOPY: 06/23/22   COLONOSCOPY: 06/22/22   esophagogastroduodenoscopy(EGD).: 06/18/22   esophagogastroduodenoscopy(EGD).: 04/26/22   esophagogastroduodenoscopy(EGD).: 07/13/21   EGD: 03/11/21   Colonoscopy: 02/16/21   esophagogastroduodenoscopy(EGD).: 01/16/21   CATARACT SURGERY - RIGHT: 08/2017   CATARACT SURGERY - LEFT: 08/2017   Total Knee Replacement.: 12/15/16   ECHOCARDIOGRAM: 11/29/16   colonscopy: 07/21/16   esophagogastroduodenoscopy(EGD).: 07/05/16   nuclear stress normal: 10/20/14   EGD: 2012   incisional hernia: 02/19/08   twisted bowels: 03/20/06   bowel surgery   nuclear stress 5/2015   normal   1/2  partial SBO   Colonoscopy.      Medications     Inpatient   acetaminophen, 650 mg= 2 tabs, ORAL, [id number], PRN    acetaminophen, 650 mg= 2 tabs, ORAL, [id number], PRN    acetaminophen, 650 mg= 2 tabs, ORAL, [id number], PRN    acetaminophen-oxycodone(acetaminophen-oxycodone 325 mg-5 mg oral tablet = Percocet), 2 tabs, ORAL, [id number], PRN    albuterol-ipratropium(albuterol-ipratropium 2.5 mg-0.5 mg/3 mL inhalation solution = DuoNeb), 3 mL, Inhalation, [id number] RESPIRATORY    azithromycin, 500 mg= 255 mL, IV Piggyback, ONCE    azithromycin(Zithromax IV), 500 mg= 255 mL, IV Piggyback, [id number]    baclofen = Lioresal, 5 mg= 0.5 tabs, ORAL, DAILY    ceftriaxone, 1 g= 50 mL, IV Piggyback, ONCE    enoxaparin(Lovenox), 40 mg= 0.4 mL, Subcutaneous, QHS    melatonin, 5 mg= 1 tabs, ORAL, [id number], PRN    methylPREDNISolone(SOLU-Medrol), 40 mg= 1 mL, IV Push, [id number]    naloxone = Narcan, 0.4 mg= 1 mL, IV Push, PRN, PRN    sodium chloride(Saline Flush), 3 mL, IV Push, [id number]    sodium chloride(Saline Flush), 3 mL, IV Push, PRN, PRN    Home   acetaminophen-oxycodone(Percocet 10 mg-325 mg oral tablet), 1 tabs, ORAL, [id number], PRN    baclofen = Lioresal(baclofen 5 mg oral tablet), 5 mg= 1 tabs, ORAL, DAILY, 1 refills      Allergies     Acetaminophen-Propoxyphene Napsylate Unknown    Demerol HCl vomit/ headache    morphine Unknown    shellfish Headache      Social History       Alcohol - Denies Alcohol Use      Domestic Concerns    *Financial Concerns Regarding Hospitalization/DischargeNo    Financial ConcernsNo      Employment/School    Status:Retired    Highest education:High school      Exercise      Home/Environment    Lives with:Daughter    *Living Situation Prior to AdmissionHome/Independent    Home equipment:Respiratory treatments    Monitoring Equipment in homeApnea monitor, Glucose monitoring, Blood pressure    *Special Services and Community Resources Prior to AdmissionNone    *Mobility Assistance Prior to  AdmissionIndependent    Home BarriersInternal Stairs    *Will patient require additional/new services upon discharge?No      Nutrition/Health    Type of diet:Regular    AppetiteGood    Feeding AssistanceIndependent      Other    Name:Daily Caffeine    Details:Consumes on average 5-10 cups of regular coffee per day Consumes on average 12oz of soda per day - diet pepsi Consumes chocolate occasionally      Substance Abuse - Denies Substance Abuse      Tobacco - Denies Tobacco Use    Use:Former smoker, quit more than 30 days ago      Family History     Arthritis: Mother.    Cancer: Negative: Mother, Father, Daughter and Son.    Colonic polyp: Negative: Daughter and Son.    Dementia: Mother.    Diabetes..: Father.    Heart disease: Father.    High blood pressure..: Mother and Father.    Health Status Family Member(s)     Family Member(s)   Relationship: Father, Age: Unknown    Relationship: Mother, Age: Unknown    -Fall prevention    -Cognitive engagement    -Monitor and treat blood pressure    -Aggressive decubitus ulcer prevention.    -Bowel and bladder care    -Optimal nutrition and supplementation as needed    -GI  and DVT prophylaxis    -Symptom control    -Ambulation as tolerated    -Will follow      Physical Exam:  Vital signs as per nursing/MA documentation were reviewed  General appearance: Alert and in no acute distress  HEENT: Normal Inspection  Neck - Normal Inspection  Respiratory : No respiratory distress. Lungs are clear   Cardiovascular: heart rate normal. No gallop  Back - normal inspection  Skin inspection:Warm  Musculoskeletal : No deformities  Neuro : Limited exam. Baseline    ROS: Review of symptoms is negative except for what is mentioned in HPI    Results/Data  Records including but not limited to electronic medical records, relevant lab work and imaging from patient's health care facility encounter were reviewed and independently verified      Charting was completed using voice recognition  technology and may include unintended errors.    Discussed with patient/family, RN, and NP.

## 2024-10-03 ENCOUNTER — NURSING HOME VISIT (OUTPATIENT)
Dept: POST ACUTE CARE | Facility: EXTERNAL LOCATION | Age: 84
End: 2024-10-03

## 2024-10-03 DIAGNOSIS — R65.20 SEPSIS WITH METABOLIC ENCEPHALOPATHY (MULTI): ICD-10-CM

## 2024-10-03 DIAGNOSIS — A41.9 SEPSIS WITH METABOLIC ENCEPHALOPATHY (MULTI): ICD-10-CM

## 2024-10-03 DIAGNOSIS — G93.41 SEPSIS WITH METABOLIC ENCEPHALOPATHY (MULTI): ICD-10-CM

## 2024-10-03 DIAGNOSIS — J96.91 RESPIRATORY FAILURE WITH HYPOXIA, UNSPECIFIED CHRONICITY (MULTI): ICD-10-CM

## 2024-10-03 DIAGNOSIS — I48.91 ATRIAL FIBRILLATION, UNSPECIFIED TYPE (MULTI): ICD-10-CM

## 2024-10-03 DIAGNOSIS — J44.9 CHRONIC OBSTRUCTIVE PULMONARY DISEASE, UNSPECIFIED COPD TYPE (MULTI): Primary | ICD-10-CM

## 2024-10-03 PROCEDURE — 99308 SBSQ NF CARE LOW MDM 20: CPT | Performed by: EMERGENCY MEDICINE

## 2024-10-03 NOTE — LETTER
Patient: Tracy Cleveland  : 1940    Encounter Date: 10/03/2024    Tracy Cleveland   84 y.o.  female  @location@      Assessment and Plan:      Diagnoses This Visit COPD with acute exacerbation Acute onset sepsis Acute UTI Lung cancer   Discharge Medications   New albuterol = Proventil, Ventolin (Proventil HFA 90 mcg/inh inhalation aerosol)1 Puffs Inhalation EVERY FOUR HOURS as needed for wheezing. Refills: 0. ALPRAZolam (Xanax 0.25 mg oral tablet)1 Tabs Oral THREE TIMES A DAY as needed for anxiety for 7 Days. Refills: 0. guaiFENesin (Mucinex 600 mg oral tablet, extended release)1 Tabs Oral EVERY TWELVE HOURS as needed Cough. losartan (losartan 50 mg oral tablet)1 Tabs Oral DAILY. methylPREDNISolone (Medrol 4 mg oral tablet)1 Packets Oral DAILY for 6 Days. as directed on package labeling. Refills: 0. metoprolol (Lopressor 50 mg oral tablet)0.5 Tabs Oral TWICE A DAY. Refills: 0.   Unchanged acetaminophen-oxycodone (Percocet 10 mg-325 mg oral tablet)1 Tabs Oral EVERY SIX HOURS as needed Severe Pain. baclofen = Lioresal (baclofen 5 mg oral tablet)1 Tabs Oral DAILY. Refills: 1.   Discontinued azithromycin (Zithromax TRI-MAYCO 500 mg oral tablet)1 Tabs Oral DAILY. stop date 24. Refills: 0. Hospital Course   This is the addendum to previously dictated discharge summary. Initially patient is planned to be released to home. But she is very weak. Could not take care of herself. Evaluated by physical therapist who recommended nursing home placement. Patient will be released to nursing home for further care.    -Fall prevention    -Cognitive engagement     -Monitor and treat blood pressure     -Aggressive decubitus ulcer prevention.     -Bowel and bladder care     -Optimal nutrition and supplementation as needed     -GI  and DVT prophylaxis     -Symptom control     -Ambulation as tolerated     -Will follow    Charting is done using voice recognition software and may contain errors which have not been completely  corrected      Source of history: Nurse, Medical personnel, Medical record, Patient.  History limitation: None.    HPI:      Patient is unable to give any detailed history and therefore history is obtained from the chart  No acute complaints voiced by the patient or acute concerns raised by nursing    History of hospitalization-    Chief Complaint Shortness of breath History of Present Illness Patient is an 84-year-old female with history of chronic respiratory failure on home O2, COPD, lung cancer. She developed sudden onset of progressive shortness of breath since early in the morning. No fever or chills, no cough, no chest pain. No abdominal pain nausea or vomiting. Presented to the ED for evaluation. Initially she was seen respiratory acute distress. She received a bronchodilator aerosol treatment back-to-back. Chest x-ray did not show any new infiltrate. ABG was unremarkable. At the time of my evaluation she is feeling much better. Patient will be admitted for COPD exacerbation    Problem List/Past Medical History     Ongoing   Acute epigastric pain    Acute hypoxemic respiratory failure due to COVID-19    Acute respiratory failure, unspecified whether with hypoxia or hypercapnia    Anemia    Anxiety state    Atrial fibrillation    Cardiomyopathy    Cataract    Cellulitis of left lower limb    Cellulitis of lower limb    Cervical spondylosis    Chronic obstructive lung disease    Chronic obstructive pulmonary disease with acute lower respiratory infection    Chronic post-traumatic stress disorder    Chronic respiratory failure with hypoxia    Closed wedge compression fracture of seventh thoracic vertebra with routine healing    Compression fracture of vertebral column    COPD (chronic obstructive pulmonary disease)    COPD exacerbation    Coronary arteriosclerosis    COVID-19    Cystitis    Delirium    Depression    Depressive disorder    Diabetes mellitus    Diabetes mellitus    Difficulty in walking, not  elsewhere classified    Disorder of lumbar spine    Dyspnea    Enlarged lymph node in neck    ESBL Escherichia coli    Essential hypertension    Fibromyalgia    Fibromyalgia    GERD (gastroesophageal reflux disease)    High Blood Pressure    History of peripheral neuropathy    History of urinary tract infection    Hospitalization within last 30 days    Hypoxia    Incontinence of urine    Injury of kidney    Insomnia    Insomnia disorder    Intestinal obstruction    Long term (current) use of aspirin    Long term prescription opiate use    Lumbar arthropathy    Lung mass    Mass of right lower lobe of lung    Mediastinal lymphadenopathy    Metabolic encephalopathy    Mitral valve disorder    Mixed hyperlipidemia    Mixed hyperlipidemia    Muscle weakness    Myocardial infarction type 2    Need for vaccination    Neuropathy    Obesity due to excess calories    Osteoarthritis    Osteoporosis    Osteoporosis    Paraesophageal hernia    Paraesophageal hernia    Paresthesia    Patella fracture    Peripheral edema    Personal history of (healed) traumatic fracture    Primary gonarthrosis, bilateral    Unspecified fracture of left patella, subsequent encounter for closed fracture with routine healing    Unspecified hearing loss, unspecified ear    Vertebral compression fracture    Vertebral compression fracture    Viral pneumonia    Wellness examination    Historical   Solitary nodule of lung      Procedure/Surgical History   Colonoscopy.: 05/01/23   esophagogastroduodenoscopy(EGD).: 04/28/23   esophagogastroduodenoscopy(EGD).: 02/01/23   esophagogastroduodenoscopy(EGD).: 09/18/22   CAPSULE ENDOSCOPY: 06/23/22   COLONOSCOPY: 06/22/22   esophagogastroduodenoscopy(EGD).: 06/18/22   esophagogastroduodenoscopy(EGD).: 04/26/22   esophagogastroduodenoscopy(EGD).: 07/13/21   EGD: 03/11/21   Colonoscopy: 02/16/21   esophagogastroduodenoscopy(EGD).: 01/16/21   CATARACT SURGERY - RIGHT: 08/2017   CATARACT SURGERY - LEFT: 08/2017    Total Knee Replacement.: 12/15/16   ECHOCARDIOGRAM: 11/29/16   colonscopy: 07/21/16   esophagogastroduodenoscopy(EGD).: 07/05/16   nuclear stress normal: 10/20/14   EGD: 2012   incisional hernia: 02/19/08   twisted bowels: 03/20/06   bowel surgery   nuclear stress 5/2015   normal   1/2 partial SBO   Colonoscopy.      Medications     Inpatient   acetaminophen, 650 mg= 2 tabs, ORAL, [id number], PRN    acetaminophen, 650 mg= 2 tabs, ORAL, [id number], PRN    acetaminophen, 650 mg= 2 tabs, ORAL, [id number], PRN    acetaminophen-oxycodone(acetaminophen-oxycodone 325 mg-5 mg oral tablet = Percocet), 2 tabs, ORAL, [id number], PRN    albuterol-ipratropium(albuterol-ipratropium 2.5 mg-0.5 mg/3 mL inhalation solution = DuoNeb), 3 mL, Inhalation, [id number] RESPIRATORY    azithromycin, 500 mg= 255 mL, IV Piggyback, ONCE    azithromycin(Zithromax IV), 500 mg= 255 mL, IV Piggyback, [id number]    baclofen = Lioresal, 5 mg= 0.5 tabs, ORAL, DAILY    ceftriaxone, 1 g= 50 mL, IV Piggyback, ONCE    enoxaparin(Lovenox), 40 mg= 0.4 mL, Subcutaneous, QHS    melatonin, 5 mg= 1 tabs, ORAL, [id number], PRN    methylPREDNISolone(SOLU-Medrol), 40 mg= 1 mL, IV Push, [id number]    naloxone = Narcan, 0.4 mg= 1 mL, IV Push, PRN, PRN    sodium chloride(Saline Flush), 3 mL, IV Push, [id number]    sodium chloride(Saline Flush), 3 mL, IV Push, PRN, PRN    Home   acetaminophen-oxycodone(Percocet 10 mg-325 mg oral tablet), 1 tabs, ORAL, [id number], PRN    baclofen = Lioresal(baclofen 5 mg oral tablet), 5 mg= 1 tabs, ORAL, DAILY, 1 refills      Allergies     Acetaminophen-Propoxyphene Napsylate Unknown    Demerol HCl vomit/ headache    morphine Unknown    shellfish Headache      Social History       Alcohol - Denies Alcohol Use      Domestic Concerns    *Financial Concerns Regarding Hospitalization/DischargeNo    Financial ConcernsNo      Employment/School    Status:Retired    Highest education:High school      Exercise      Home/Environment    Lives  with:Daughter    *Living Situation Prior to AdmissionHome/Independent    Home equipment:Respiratory treatments    Monitoring Equipment in homeApnea monitor, Glucose monitoring, Blood pressure    *Special Services and Community Resources Prior to AdmissionNone    *Mobility Assistance Prior to AdmissionIndependent    Home BarriersInternal Stairs    *Will patient require additional/new services upon discharge?No      Nutrition/Health    Type of diet:Regular    AppetiteGood    Feeding AssistanceIndependent      Other    Name:Daily Caffeine    Details:Consumes on average 5-10 cups of regular coffee per day Consumes on average 12oz of soda per day - diet pepsi Consumes chocolate occasionally      Substance Abuse - Denies Substance Abuse      Tobacco - Denies Tobacco Use    Use:Former smoker, quit more than 30 days ago      Family History     Arthritis: Mother.    Cancer: Negative: Mother, Father, Daughter and Son.    Colonic polyp: Negative: Daughter and Son.    Dementia: Mother.    Diabetes..: Father.    Heart disease: Father.    High blood pressure..: Mother and Father.    Health Status Family Member(s)     Family Member(s)   Relationship: Father, Age: Unknown    Relationship: Mother, Age: Unknown    -Fall prevention    -Cognitive engagement    -Monitor and treat blood pressure    -Aggressive decubitus ulcer prevention.    -Bowel and bladder care    -Optimal nutrition and supplementation as needed    -GI  and DVT prophylaxis    -Symptom control    -Ambulation as tolerated    -Will follow      Physical Exam:  Vital signs as per nursing/MA documentation were reviewed  General appearance: Alert and in no acute distress  HEENT: Normal Inspection  Neck - Normal Inspection  Respiratory : No respiratory distress. Lungs are clear   Cardiovascular: heart rate normal. No gallop  Back - normal inspection  Skin inspection:Warm  Musculoskeletal : No deformities  Neuro : Limited exam. Baseline    ROS: Review of symptoms is  negative except for what is mentioned in HPI    Results/Data  Records including but not limited to electronic medical records, relevant lab work and imaging from patient's health care facility encounter were reviewed and independently verified      Charting was completed using voice recognition technology and may include unintended errors.    Discussed with patient/family, RN, and NP.      Electronically Signed By: Umer Montano MD   10/3/24  4:30 PM

## 2024-10-03 NOTE — PROGRESS NOTES
Tracy Cleveland   84 y.o.  female  @location@      Assessment and Plan:      Diagnoses This Visit COPD with acute exacerbation Acute onset sepsis Acute UTI Lung cancer   Discharge Medications   New albuterol = Proventil, Ventolin (Proventil HFA 90 mcg/inh inhalation aerosol)1 Puffs Inhalation EVERY FOUR HOURS as needed for wheezing. Refills: 0. ALPRAZolam (Xanax 0.25 mg oral tablet)1 Tabs Oral THREE TIMES A DAY as needed for anxiety for 7 Days. Refills: 0. guaiFENesin (Mucinex 600 mg oral tablet, extended release)1 Tabs Oral EVERY TWELVE HOURS as needed Cough. losartan (losartan 50 mg oral tablet)1 Tabs Oral DAILY. methylPREDNISolone (Medrol 4 mg oral tablet)1 Packets Oral DAILY for 6 Days. as directed on package labeling. Refills: 0. metoprolol (Lopressor 50 mg oral tablet)0.5 Tabs Oral TWICE A DAY. Refills: 0.   Unchanged acetaminophen-oxycodone (Percocet 10 mg-325 mg oral tablet)1 Tabs Oral EVERY SIX HOURS as needed Severe Pain. baclofen = Lioresal (baclofen 5 mg oral tablet)1 Tabs Oral DAILY. Refills: 1.   Discontinued azithromycin (Zithromax TRI-MAYCO 500 mg oral tablet)1 Tabs Oral DAILY. stop date 8/16/24. Refills: 0. Hospital Course   This is the addendum to previously dictated discharge summary. Initially patient is planned to be released to home. But she is very weak. Could not take care of herself. Evaluated by physical therapist who recommended nursing home placement. Patient will be released to nursing home for further care.    -Fall prevention    -Cognitive engagement     -Monitor and treat blood pressure     -Aggressive decubitus ulcer prevention.     -Bowel and bladder care     -Optimal nutrition and supplementation as needed     -GI  and DVT prophylaxis     -Symptom control     -Ambulation as tolerated     -Will follow    Charting is done using voice recognition software and may contain errors which have not been completely corrected      Source of history: Nurse, Medical personnel, Medical record,  Patient.  History limitation: None.    HPI:      Patient is unable to give any detailed history and therefore history is obtained from the chart  No acute complaints voiced by the patient or acute concerns raised by nursing    History of hospitalization-    Chief Complaint Shortness of breath History of Present Illness Patient is an 84-year-old female with history of chronic respiratory failure on home O2, COPD, lung cancer. She developed sudden onset of progressive shortness of breath since early in the morning. No fever or chills, no cough, no chest pain. No abdominal pain nausea or vomiting. Presented to the ED for evaluation. Initially she was seen respiratory acute distress. She received a bronchodilator aerosol treatment back-to-back. Chest x-ray did not show any new infiltrate. ABG was unremarkable. At the time of my evaluation she is feeling much better. Patient will be admitted for COPD exacerbation    Problem List/Past Medical History     Ongoing   Acute epigastric pain    Acute hypoxemic respiratory failure due to COVID-19    Acute respiratory failure, unspecified whether with hypoxia or hypercapnia    Anemia    Anxiety state    Atrial fibrillation    Cardiomyopathy    Cataract    Cellulitis of left lower limb    Cellulitis of lower limb    Cervical spondylosis    Chronic obstructive lung disease    Chronic obstructive pulmonary disease with acute lower respiratory infection    Chronic post-traumatic stress disorder    Chronic respiratory failure with hypoxia    Closed wedge compression fracture of seventh thoracic vertebra with routine healing    Compression fracture of vertebral column    COPD (chronic obstructive pulmonary disease)    COPD exacerbation    Coronary arteriosclerosis    COVID-19    Cystitis    Delirium    Depression    Depressive disorder    Diabetes mellitus    Diabetes mellitus    Difficulty in walking, not elsewhere classified    Disorder of lumbar spine    Dyspnea    Enlarged lymph  node in neck    ESBL Escherichia coli    Essential hypertension    Fibromyalgia    Fibromyalgia    GERD (gastroesophageal reflux disease)    High Blood Pressure    History of peripheral neuropathy    History of urinary tract infection    Hospitalization within last 30 days    Hypoxia    Incontinence of urine    Injury of kidney    Insomnia    Insomnia disorder    Intestinal obstruction    Long term (current) use of aspirin    Long term prescription opiate use    Lumbar arthropathy    Lung mass    Mass of right lower lobe of lung    Mediastinal lymphadenopathy    Metabolic encephalopathy    Mitral valve disorder    Mixed hyperlipidemia    Mixed hyperlipidemia    Muscle weakness    Myocardial infarction type 2    Need for vaccination    Neuropathy    Obesity due to excess calories    Osteoarthritis    Osteoporosis    Osteoporosis    Paraesophageal hernia    Paraesophageal hernia    Paresthesia    Patella fracture    Peripheral edema    Personal history of (healed) traumatic fracture    Primary gonarthrosis, bilateral    Unspecified fracture of left patella, subsequent encounter for closed fracture with routine healing    Unspecified hearing loss, unspecified ear    Vertebral compression fracture    Vertebral compression fracture    Viral pneumonia    Wellness examination    Historical   Solitary nodule of lung      Procedure/Surgical History   Colonoscopy.: 05/01/23   esophagogastroduodenoscopy(EGD).: 04/28/23   esophagogastroduodenoscopy(EGD).: 02/01/23   esophagogastroduodenoscopy(EGD).: 09/18/22   CAPSULE ENDOSCOPY: 06/23/22   COLONOSCOPY: 06/22/22   esophagogastroduodenoscopy(EGD).: 06/18/22   esophagogastroduodenoscopy(EGD).: 04/26/22   esophagogastroduodenoscopy(EGD).: 07/13/21   EGD: 03/11/21   Colonoscopy: 02/16/21   esophagogastroduodenoscopy(EGD).: 01/16/21   CATARACT SURGERY - RIGHT: 08/2017   CATARACT SURGERY - LEFT: 08/2017   Total Knee Replacement.: 12/15/16   ECHOCARDIOGRAM: 11/29/16   colonscopy:  07/21/16   esophagogastroduodenoscopy(EGD).: 07/05/16   nuclear stress normal: 10/20/14   EGD: 2012   incisional hernia: 02/19/08   twisted bowels: 03/20/06   bowel surgery   nuclear stress 5/2015   normal   1/2 partial SBO   Colonoscopy.      Medications     Inpatient   acetaminophen, 650 mg= 2 tabs, ORAL, [id number], PRN    acetaminophen, 650 mg= 2 tabs, ORAL, [id number], PRN    acetaminophen, 650 mg= 2 tabs, ORAL, [id number], PRN    acetaminophen-oxycodone(acetaminophen-oxycodone 325 mg-5 mg oral tablet = Percocet), 2 tabs, ORAL, [id number], PRN    albuterol-ipratropium(albuterol-ipratropium 2.5 mg-0.5 mg/3 mL inhalation solution = DuoNeb), 3 mL, Inhalation, [id number] RESPIRATORY    azithromycin, 500 mg= 255 mL, IV Piggyback, ONCE    azithromycin(Zithromax IV), 500 mg= 255 mL, IV Piggyback, [id number]    baclofen = Lioresal, 5 mg= 0.5 tabs, ORAL, DAILY    ceftriaxone, 1 g= 50 mL, IV Piggyback, ONCE    enoxaparin(Lovenox), 40 mg= 0.4 mL, Subcutaneous, QHS    melatonin, 5 mg= 1 tabs, ORAL, [id number], PRN    methylPREDNISolone(SOLU-Medrol), 40 mg= 1 mL, IV Push, [id number]    naloxone = Narcan, 0.4 mg= 1 mL, IV Push, PRN, PRN    sodium chloride(Saline Flush), 3 mL, IV Push, [id number]    sodium chloride(Saline Flush), 3 mL, IV Push, PRN, PRN    Home   acetaminophen-oxycodone(Percocet 10 mg-325 mg oral tablet), 1 tabs, ORAL, [id number], PRN    baclofen = Lioresal(baclofen 5 mg oral tablet), 5 mg= 1 tabs, ORAL, DAILY, 1 refills      Allergies     Acetaminophen-Propoxyphene Napsylate Unknown    Demerol HCl vomit/ headache    morphine Unknown    shellfish Headache      Social History       Alcohol - Denies Alcohol Use      Domestic Concerns    *Financial Concerns Regarding Hospitalization/DischargeNo    Financial ConcernsNo      Employment/School    Status:Retired    Highest education:High school      Exercise      Home/Environment    Lives with:Daughter    *Living Situation Prior to AdmissionHome/Independent    Home  equipment:Respiratory treatments    Monitoring Equipment in homeApnea monitor, Glucose monitoring, Blood pressure    *Special Services and Community Resources Prior to AdmissionNone    *Mobility Assistance Prior to AdmissionIndependent    Home BarriersInternal Stairs    *Will patient require additional/new services upon discharge?No      Nutrition/Health    Type of diet:Regular    AppetiteGood    Feeding AssistanceIndependent      Other    Name:Daily Caffeine    Details:Consumes on average 5-10 cups of regular coffee per day Consumes on average 12oz of soda per day - diet pepsi Consumes chocolate occasionally      Substance Abuse - Denies Substance Abuse      Tobacco - Denies Tobacco Use    Use:Former smoker, quit more than 30 days ago      Family History     Arthritis: Mother.    Cancer: Negative: Mother, Father, Daughter and Son.    Colonic polyp: Negative: Daughter and Son.    Dementia: Mother.    Diabetes..: Father.    Heart disease: Father.    High blood pressure..: Mother and Father.    Health Status Family Member(s)     Family Member(s)   Relationship: Father, Age: Unknown    Relationship: Mother, Age: Unknown    -Fall prevention    -Cognitive engagement    -Monitor and treat blood pressure    -Aggressive decubitus ulcer prevention.    -Bowel and bladder care    -Optimal nutrition and supplementation as needed    -GI  and DVT prophylaxis    -Symptom control    -Ambulation as tolerated    -Will follow      Physical Exam:  Vital signs as per nursing/MA documentation were reviewed  General appearance: Alert and in no acute distress  HEENT: Normal Inspection  Neck - Normal Inspection  Respiratory : No respiratory distress. Lungs are clear   Cardiovascular: heart rate normal. No gallop  Back - normal inspection  Skin inspection:Warm  Musculoskeletal : No deformities  Neuro : Limited exam. Baseline    ROS: Review of symptoms is negative except for what is mentioned in HPI    Results/Data  Records including  but not limited to electronic medical records, relevant lab work and imaging from patient's health care facility encounter were reviewed and independently verified      Charting was completed using voice recognition technology and may include unintended errors.    Discussed with patient/family, RN, and NP.